# Patient Record
Sex: FEMALE | Race: WHITE
[De-identification: names, ages, dates, MRNs, and addresses within clinical notes are randomized per-mention and may not be internally consistent; named-entity substitution may affect disease eponyms.]

---

## 2018-12-06 ENCOUNTER — HOSPITAL ENCOUNTER (OUTPATIENT)
Dept: HOSPITAL 88 - IMCU | Age: 83
Setting detail: OBSERVATION
LOS: 1 days | Discharge: OTHER INSTITUTION | End: 2018-12-07
Attending: INTERNAL MEDICINE | Admitting: INTERNAL MEDICINE
Payer: MEDICARE

## 2018-12-06 VITALS — SYSTOLIC BLOOD PRESSURE: 169 MMHG | DIASTOLIC BLOOD PRESSURE: 81 MMHG

## 2018-12-06 VITALS — HEIGHT: 59 IN | BODY MASS INDEX: 23.18 KG/M2 | WEIGHT: 115 LBS

## 2018-12-06 VITALS — SYSTOLIC BLOOD PRESSURE: 117 MMHG | DIASTOLIC BLOOD PRESSURE: 58 MMHG

## 2018-12-06 VITALS — DIASTOLIC BLOOD PRESSURE: 81 MMHG | SYSTOLIC BLOOD PRESSURE: 169 MMHG

## 2018-12-06 VITALS — DIASTOLIC BLOOD PRESSURE: 58 MMHG | SYSTOLIC BLOOD PRESSURE: 117 MMHG

## 2018-12-06 VITALS — SYSTOLIC BLOOD PRESSURE: 96 MMHG | DIASTOLIC BLOOD PRESSURE: 62 MMHG

## 2018-12-06 DIAGNOSIS — I35.8: ICD-10-CM

## 2018-12-06 DIAGNOSIS — I25.84: ICD-10-CM

## 2018-12-06 DIAGNOSIS — Z79.02: ICD-10-CM

## 2018-12-06 DIAGNOSIS — I25.82: ICD-10-CM

## 2018-12-06 DIAGNOSIS — I25.110: Primary | ICD-10-CM

## 2018-12-06 LAB
ALBUMIN SERPL-MCNC: 4.1 G/DL (ref 3.5–5)
ALBUMIN/GLOB SERPL: 1.1 {RATIO} (ref 0.8–2)
ALP SERPL-CCNC: 58 IU/L (ref 40–150)
ALT SERPL-CCNC: 14 IU/L (ref 0–55)
ANION GAP SERPL CALC-SCNC: 16.1 MMOL/L (ref 8–16)
BASOPHILS # BLD AUTO: 0 10*3/UL (ref 0–0.1)
BASOPHILS NFR BLD AUTO: 0.4 % (ref 0–1)
BUN SERPL-MCNC: 23 MG/DL (ref 7–26)
BUN/CREAT SERPL: 23 (ref 6–25)
CALCIUM SERPL-MCNC: 10 MG/DL (ref 8.4–10.2)
CHLORIDE SERPL-SCNC: 104 MMOL/L (ref 98–107)
CO2 SERPL-SCNC: 22 MMOL/L (ref 22–29)
DEPRECATED APTT PLAS QN: 27.2 SECONDS (ref 23.8–35.5)
DEPRECATED INR PLAS: 0.84
DEPRECATED NEUTROPHILS # BLD AUTO: 5.2 10*3/UL (ref 2.1–6.9)
EGFRCR SERPLBLD CKD-EPI 2021: 52 ML/MIN (ref 60–?)
EOSINOPHIL # BLD AUTO: 0.1 10*3/UL (ref 0–0.4)
EOSINOPHIL NFR BLD AUTO: 1.7 % (ref 0–6)
ERYTHROCYTE [DISTWIDTH] IN CORD BLOOD: 13.2 % (ref 11.7–14.4)
GLOBULIN PLAS-MCNC: 3.9 G/DL (ref 2.3–3.5)
GLUCOSE SERPLBLD-MCNC: 105 MG/DL (ref 74–118)
HCT VFR BLD AUTO: 37.5 % (ref 34.2–44.1)
HGB BLD-MCNC: 12.2 G/DL (ref 12–16)
LYMPHOCYTES # BLD: 1.8 10*3/UL (ref 1–3.2)
LYMPHOCYTES NFR BLD AUTO: 23.1 % (ref 18–39.1)
MCH RBC QN AUTO: 32.1 PG (ref 28–32)
MCHC RBC AUTO-ENTMCNC: 32.5 G/DL (ref 31–35)
MCV RBC AUTO: 98.7 FL (ref 81–99)
MONOCYTES # BLD AUTO: 0.7 10*3/UL (ref 0.2–0.8)
MONOCYTES NFR BLD AUTO: 8.7 % (ref 4.4–11.3)
NEUTS SEG NFR BLD AUTO: 65.7 % (ref 38.7–80)
PLATELET # BLD AUTO: 243 X10E3/UL (ref 140–360)
POTASSIUM SERPL-SCNC: 4.1 MMOL/L (ref 3.5–5.1)
PROTHROMBIN TIME: 12.3 SECONDS (ref 11.9–14.5)
RBC # BLD AUTO: 3.8 X10E6/UL (ref 3.6–5.1)
SODIUM SERPL-SCNC: 138 MMOL/L (ref 136–145)

## 2018-12-06 PROCEDURE — 93306 TTE W/DOPPLER COMPLETE: CPT

## 2018-12-06 PROCEDURE — 85025 COMPLETE CBC W/AUTO DIFF WBC: CPT

## 2018-12-06 PROCEDURE — 85610 PROTHROMBIN TIME: CPT

## 2018-12-06 PROCEDURE — 93005 ELECTROCARDIOGRAM TRACING: CPT

## 2018-12-06 PROCEDURE — 80053 COMPREHEN METABOLIC PANEL: CPT

## 2018-12-06 PROCEDURE — 71046 X-RAY EXAM CHEST 2 VIEWS: CPT

## 2018-12-06 PROCEDURE — 93454 CORONARY ARTERY ANGIO S&I: CPT

## 2018-12-06 PROCEDURE — 36415 COLL VENOUS BLD VENIPUNCTURE: CPT

## 2018-12-06 PROCEDURE — 85730 THROMBOPLASTIN TIME PARTIAL: CPT

## 2018-12-06 NOTE — NUR
Report received and walking rounds complete. Pt resting in bed and in no apparent distress. 
Pt has tele. All safety measures ensured, bed alarm on, and pt call bell near. Pt encouraged 
to use call bell for assistance.

## 2018-12-06 NOTE — NUR
Report received and walking rounds complete. Pt resting in bed and in no apparent distress. 
Pt has tele. All safety measures ensured, bed alarm on, and pt etta bell near. Pt encouraged 
to use call bell for assistance. 

-------------------------------------------------------------------------------

Addendum: 12/06/18 at 1922 by Malena Andrews RN

-------------------------------------------------------------------------------

Note made in error under wrong RN due to nurse still being logged in.

## 2018-12-06 NOTE — DIAGNOSTIC IMAGING REPORT
EXAMINATION:  CHEST 2 VIEWS    



INDICATION: Chest pain  

^MD ORDER

^[id number]

^[id number]

^Y    



COMPARISON:  CT abdomen and pelvis 8/13/2018

     

FINDINGS:  PA and lateral views



TUBES and LINES:  None.



LUNGS:  Lungs are well inflated.  Mild linear scarring in the left lower lobe

remains stable. Mild bilateral hilar peribronchial wall thickening.  No lobar

consolidations. No pulmonary edema.



PLEURA:  No pleural effusion or pneumothorax.



HEART AND MEDIASTINUM:  The cardiac silhouette is within normal limits. Diffuse

calcification of the thoracic aorta.



BONES AND SOFT TISSUES:  Exaggerated kyphosis of the thoracic spine with

associated multilevel degenerative changes.  Surgical clip overlying the left

upper abdomen.



UPPER ABDOMEN: No free air under the diaphragm. 



IMPRESSION: 

Bilateral hilar peribronchial wall thickening may relate to reactive airway

disease or viral infection.





Signed by: Dr. Gertrude Baldwin M.D. on 12/6/2018 4:19 PM

## 2018-12-06 NOTE — NUR
Pt rec'd to observation unit #[id number]; tele applied; plan of care discussed with patient and 
family member. Orders entered.  Family to go home and bring copy of DMPOA and medications.

## 2018-12-07 VITALS — DIASTOLIC BLOOD PRESSURE: 55 MMHG | SYSTOLIC BLOOD PRESSURE: 111 MMHG

## 2018-12-07 VITALS — DIASTOLIC BLOOD PRESSURE: 60 MMHG | SYSTOLIC BLOOD PRESSURE: 128 MMHG

## 2018-12-07 VITALS — SYSTOLIC BLOOD PRESSURE: 128 MMHG | DIASTOLIC BLOOD PRESSURE: 60 MMHG

## 2018-12-07 VITALS — DIASTOLIC BLOOD PRESSURE: 73 MMHG | SYSTOLIC BLOOD PRESSURE: 172 MMHG

## 2018-12-07 VITALS — SYSTOLIC BLOOD PRESSURE: 111 MMHG | DIASTOLIC BLOOD PRESSURE: 57 MMHG

## 2018-12-07 VITALS — SYSTOLIC BLOOD PRESSURE: 119 MMHG | DIASTOLIC BLOOD PRESSURE: 59 MMHG

## 2018-12-07 NOTE — NUR
pt returned from cath lab, per report pt will be transferring to [facility] for procedure. pt 
vs stable, no s/s distress. per MD ok to eat/drink. dressing CDI at this time.

## 2018-12-07 NOTE — NUR
report given to Parviz ZHAO at [facility]. Waiting for transportation to come and 
transfer patient to new facility. will continue to monitor patient.

## 2018-12-07 NOTE — NUR
pt resting in bed. amb to bathroom. pt did not want to sign consent for heart cath today 
until speaks with MD. will continue to monitor

## 2018-12-07 NOTE — NUR
[facility] is in building to transfer patient downtown to [facility] in the medical center. 
appropriate paperwork has been signed. patient and family aware of transfer. Receiving Nurse 
Parviz has been notified that patient is now enroute to [facility].

## 2018-12-07 NOTE — NUR
CASE MANAGEMENT INITIAL ASSESSMENT 

 to bedside to discuss plan of care with patient/family. CM/SW role and care 
transitions discussed. Anticipated discharge plan discussed along with duration of care. 
CM/SW discussed patients right to make decisions in care.  CM/SW work hours given.  

Patient lives: IN OWN HOUSE WITH DAUGHTER DANNI [phone]

Admit/Transfer: 

POA/Emergency contact: DAUGHTER DANNI [phone]

Current/Previous Home Health: NONE

PCP/Follow-up Care: MERE

Current/Previous DME: [facility] BUT STATES RARELY USES

Other Services: NONE

Employment Status: RETIRED

Areas of Concerns: NONE

Referral Needs: NONE

Education Needs: NONE

IMM/MOON given and signed (if applicable): GALEANO

Goal for discharge:RETURN HOME INDEPENDENTLY

CM/SW left business card at the bedside with contact information. Name and number was also 
written on the patients whiteboard. Patient verbalized understanding of discussion. CM will 
follow-up with ongoing discharge and transition of care needs.

## 2018-12-10 NOTE — DISCHARGE SUMMARY
DISCHARGE DIAGNOSIS:  Unstable angina with severe coronary artery disease.  





DISCHARGE CONDITION:  Stable.



Patient was transferred to [facility] under the care 

of Dr. Pennington for higher level of care/coronary artery bypass surgery.



HOSPITAL COURSE:  Ms. Sandy was admitted with unstable angina and 

worsening rest pain. She underwent coronary angiography and demonstrated 

severe 4-vessel coronary artery disease, including 90% left main stenosis.  

She is transferred in stable condition to the [facility] for further 

intervention.



DISCHARGE MEDICATIONS:  Please see MAR.  



 



 _________________________________

JESSICA LEE MD



DD:  12/10/2018 07:31

DT:  12/10/2018 07:48

Job#:  [id number]

## 2018-12-10 NOTE — OPERATIVE REPORT
DATE OF PROCEDURE:  December 07, 2018

 

INDICATIONS:  Coronary artery disease and unstable angina.



PROCEDURES PERFORMED

1.  Left heart catheterization.  

2.  Selective coronary angiography.

3.  Deployment of right groin Mynx closure device.



COMPLICATIONS:  None.



RECOMMENDATIONS:  Coronary artery bypass surgery versus high risk 

percutaneous coronary intervention.



Access was obtained in the right femoral artery.  A 6-Maldivian sheath was 

placed.  A 90% left main stenosis.  Circumflex 70%.  Left anterior 

descending artery diffuse 90% proximal stenosis.  Heavily calcified right 

coronary artery is completely occluded.  Faint collaterals filled the right 

posterior descending artery.



Right groin repaired using Mynx closure advice.  Patient was transferred to 

[facility] for coronary artery bypass surgery.



  







DD:  12/10/2018 07:30

DT:  12/10/2018 07:39

Job#:  [id number]